# Patient Record
Sex: FEMALE | Race: WHITE | NOT HISPANIC OR LATINO | URBAN - METROPOLITAN AREA
[De-identification: names, ages, dates, MRNs, and addresses within clinical notes are randomized per-mention and may not be internally consistent; named-entity substitution may affect disease eponyms.]

---

## 2017-03-23 ENCOUNTER — IMPORTED ENCOUNTER (OUTPATIENT)
Dept: URBAN - METROPOLITAN AREA CLINIC 31 | Facility: CLINIC | Age: 52
End: 2017-03-23

## 2017-03-23 PROBLEM — H04.123: Noted: 2017-03-23

## 2017-03-23 PROCEDURE — 92014 COMPRE OPH EXAM EST PT 1/>: CPT

## 2017-03-23 PROCEDURE — 92015 DETERMINE REFRACTIVE STATE: CPT

## 2017-03-23 NOTE — PATIENT DISCUSSION
1.  Dry Eyes OU:  Start artificials tears and use at least 2x per day. Encouraged regular use. 2.  PVD OD- disc with pt.3. NO cls. POssibly dist ou in future. 4.  Rx change5.   RTN 1 yr HERIBERTO  [facility]

## 2020-09-11 ENCOUNTER — IMPORTED ENCOUNTER (OUTPATIENT)
Dept: URBAN - METROPOLITAN AREA CLINIC 31 | Facility: CLINIC | Age: 55
End: 2020-09-11

## 2020-09-11 PROBLEM — H04.123: Noted: 2020-09-11

## 2020-09-11 PROCEDURE — 92014 COMPRE OPH EXAM EST PT 1/>: CPT

## 2020-09-11 NOTE — PATIENT DISCUSSION
1.  Dry Eyes OU:  Start artificials tears and use at least 2x per day. Encouraged regular use. 2.  PVD OD- disc with pt.3. NO cls. POssibly dist ou in future. 4.  Rx change for near 5. Fam Hx Narrow angles6.   RTN 1 yr CE   [facility]

## 2021-09-13 ENCOUNTER — IMPORTED ENCOUNTER (OUTPATIENT)
Dept: URBAN - METROPOLITAN AREA CLINIC 31 | Facility: CLINIC | Age: 56
End: 2021-09-13

## 2021-09-13 PROBLEM — H04.123: Noted: 2021-09-13

## 2021-09-13 PROCEDURE — 92014 COMPRE OPH EXAM EST PT 1/>: CPT

## 2021-09-13 NOTE — PATIENT DISCUSSION
1.  Dry Eyes OU:  Start artificials tears and use at least 2x per day. Encouraged regular use. 2.  PVD OD- disc with pt.3. NO cls. POssibly dist ou in future. --TC MF toric in future-- explained will not be like gls. 4.  Rx change for near --wants safety gls rx for work--5. Fam Hx Narrow angles6.   RTN 1 yr CE   [facility]

## 2022-04-02 ASSESSMENT — VISUAL ACUITY
OS_CC: 20/20
OD_CC: 20/30
OD_SC: 20/25
OS_SC: 20/25

## 2022-04-02 ASSESSMENT — TONOMETRY
OD_IOP_MMHG: 15
OS_IOP_MMHG: 15
OS_IOP_MMHG: 16
OD_IOP_MMHG: 15
OD_IOP_MMHG: 17
OS_IOP_MMHG: 16

## 2022-09-14 ENCOUNTER — ESTABLISHED PATIENT (OUTPATIENT)
Dept: URBAN - METROPOLITAN AREA CLINIC 34 | Facility: CLINIC | Age: 57
End: 2022-09-14

## 2022-09-14 DIAGNOSIS — H52.4: ICD-10-CM

## 2022-09-14 PROCEDURE — 92014 COMPRE OPH EXAM EST PT 1/>: CPT

## 2022-09-14 PROCEDURE — 92015 DETERMINE REFRACTIVE STATE: CPT

## 2022-09-14 ASSESSMENT — TONOMETRY
OS_IOP_MMHG: 17
OD_IOP_MMHG: 17

## 2022-09-14 ASSESSMENT — VISUAL ACUITY
OD_CC: 20/20
OS_CC: 20/20

## 2023-11-13 ENCOUNTER — COMPREHENSIVE EXAM (OUTPATIENT)
Dept: URBAN - METROPOLITAN AREA CLINIC 34 | Facility: CLINIC | Age: 58
End: 2023-11-13

## 2023-11-13 DIAGNOSIS — Z01.00: ICD-10-CM

## 2023-11-13 DIAGNOSIS — H52.4: ICD-10-CM

## 2023-11-13 PROCEDURE — 92015 DETERMINE REFRACTIVE STATE: CPT

## 2023-11-13 PROCEDURE — 92014 COMPRE OPH EXAM EST PT 1/>: CPT

## 2023-11-13 ASSESSMENT — TONOMETRY
OS_IOP_MMHG: 15
OD_IOP_MMHG: 15

## 2023-11-13 ASSESSMENT — VISUAL ACUITY
OD_CC: J1+
OS_CC: 20/25
OS_CC: J1+
OD_CC: 20/20

## 2025-06-13 ENCOUNTER — FOLLOW UP (OUTPATIENT)
Age: 60
End: 2025-06-13

## 2025-06-13 DIAGNOSIS — H43.391: ICD-10-CM

## 2025-06-13 DIAGNOSIS — G43.B0: ICD-10-CM

## 2025-06-13 DIAGNOSIS — H43.393: ICD-10-CM

## 2025-06-13 PROCEDURE — 92250 FUNDUS PHOTOGRAPHY W/I&R: CPT

## 2025-06-13 PROCEDURE — 99214 OFFICE O/P EST MOD 30 MIN: CPT

## 2025-06-20 ENCOUNTER — COMPREHENSIVE EXAM (OUTPATIENT)
Age: 60
End: 2025-06-20

## 2025-06-20 DIAGNOSIS — Z01.00: ICD-10-CM

## 2025-06-20 DIAGNOSIS — H52.4: ICD-10-CM

## 2025-06-20 PROCEDURE — 92014 COMPRE OPH EXAM EST PT 1/>: CPT

## 2025-06-20 PROCEDURE — 92015 DETERMINE REFRACTIVE STATE: CPT
